# Patient Record
Sex: FEMALE | Race: WHITE | NOT HISPANIC OR LATINO | Employment: FULL TIME | URBAN - METROPOLITAN AREA
[De-identification: names, ages, dates, MRNs, and addresses within clinical notes are randomized per-mention and may not be internally consistent; named-entity substitution may affect disease eponyms.]

---

## 2017-06-29 RX ORDER — LOTEPREDNOL ETABONATE 5 MG/ML
1 SUSPENSION/ DROPS OPHTHALMIC 2 TIMES DAILY
Qty: 5 ML | Refills: 3 | Status: SHIPPED | OUTPATIENT
Start: 2017-06-29 | End: 2018-02-21 | Stop reason: SDUPTHER

## 2017-08-23 ENCOUNTER — OFFICE VISIT (OUTPATIENT)
Dept: OPHTHALMOLOGY | Facility: CLINIC | Age: 47
End: 2017-08-23
Payer: COMMERCIAL

## 2017-08-23 DIAGNOSIS — M35.01 KERATITIS SICCA, BILATERAL: Primary | ICD-10-CM

## 2017-08-23 DIAGNOSIS — Z46.0 ENCOUNTER FOR FITTING OR ADJUSTMENT OF SPECTACLES OR CONTACT LENSES: ICD-10-CM

## 2017-08-23 DIAGNOSIS — H52.4 BILATERAL PRESBYOPIA: ICD-10-CM

## 2017-08-23 DIAGNOSIS — H52.03 HYPEROPIA, BILATERAL: ICD-10-CM

## 2017-08-23 PROCEDURE — 92015 DETERMINE REFRACTIVE STATE: CPT | Mod: S$GLB,,, | Performed by: OPTOMETRIST

## 2017-08-23 PROCEDURE — 99999 PR PBB SHADOW E&M-EST. PATIENT-LVL I: CPT | Mod: PBBFAC,,, | Performed by: OPTOMETRIST

## 2017-08-23 PROCEDURE — 68761 CLOSE TEAR DUCT OPENING: CPT | Mod: E4,S$GLB,, | Performed by: OPTOMETRIST

## 2017-08-23 PROCEDURE — 92310 CONTACT LENS FITTING OU: CPT | Mod: ,,, | Performed by: OPTOMETRIST

## 2017-08-23 PROCEDURE — 92012 INTRM OPH EXAM EST PATIENT: CPT | Mod: 25,S$GLB,, | Performed by: OPTOMETRIST

## 2017-08-23 NOTE — PROGRESS NOTES
HPI     Blurred vision left eye today may be due to the weather. Last eye exam   08/08/2016. No dilation today. Update glasses and contact lenses RX.   Discuss fee to update contact lenses.    Last edited by Cherry Hampton on 8/23/2017 11:10 AM. (History)            Assessment /Plan     For exam results, see Encounter Report.    Keratitis sicca, bilateral    Hyperopia, bilateral    Bilateral presbyopia    Encounter for fitting or adjustment of spectacles or contact lenses      Severe dry eye, taking Restasis, Artificial tears, Omega 3    Inserted bilateral punctal plugs, left lower lid puncta and right lower lid puncta.  Oasis Form Fit Hydrogel Canicular Plugs  Ref 6303 Lot [id number] 2018-07  Pt tolerated well.    Discussed CL charges.  Dispense Final Rx for glasses  No changes CL Rx  RTC 1 year

## 2017-09-21 DIAGNOSIS — H10.13 ALLERGIC CONJUNCTIVITIS, BILATERAL: ICD-10-CM

## 2017-09-21 RX ORDER — OLOPATADINE HYDROCHLORIDE 2 MG/ML
1 SOLUTION/ DROPS OPHTHALMIC DAILY PRN
Qty: 1 BOTTLE | Refills: 12 | Status: SHIPPED | OUTPATIENT
Start: 2017-09-21 | End: 2018-10-04

## 2017-09-29 ENCOUNTER — TELEPHONE (OUTPATIENT)
Dept: OPHTHALMOLOGY | Facility: CLINIC | Age: 47
End: 2017-09-29

## 2017-09-29 NOTE — TELEPHONE ENCOUNTER
----- Message from Joana Boswell sent at 9/29/2017  9:54 AM CDT -----  Contact: pt  She's calling to speak to a nurse, would not give reason, please advise [phone] (home)

## 2017-10-13 RX ORDER — CYCLOSPORINE 0.5 MG/ML
1 EMULSION OPHTHALMIC 2 TIMES DAILY
Qty: 60 EACH | Refills: 12 | Status: SHIPPED | OUTPATIENT
Start: 2017-10-13 | End: 2018-11-14 | Stop reason: SDUPTHER

## 2017-10-13 RX ORDER — CYCLOSPORINE 0.5 MG/ML
EMULSION OPHTHALMIC
Qty: 60 VIAL | Refills: 8 | Status: SHIPPED | OUTPATIENT
Start: 2017-10-13 | End: 2018-10-12 | Stop reason: SDUPTHER

## 2018-02-16 ENCOUNTER — TELEPHONE (OUTPATIENT)
Dept: OPHTHALMOLOGY | Facility: CLINIC | Age: 48
End: 2018-02-16

## 2018-02-16 NOTE — TELEPHONE ENCOUNTER
----- Message from Lianne Mayer sent at 2/16/2018 11:07 AM CST -----  Contact: PT   PT states she needs a trail set of contacts a little stronger than she has currently called in.  [phone] (home)

## 2018-02-16 NOTE — TELEPHONE ENCOUNTER
----- Message from Lianne Mayer sent at 2/16/2018 11:07 AM CST -----  Contact: PT   PT states she needs a trail set of contacts a little stronger than she has currently called in.  [phone] (home)

## 2018-02-16 NOTE — TELEPHONE ENCOUNTER
----- Message from Edyta Pack sent at 2/16/2018  3:01 PM CST -----  Contact: pt  She's calling in regards to appt pt stated the RX can be called in for the trial pair contacts instead of her making an appt [phone] ([place])

## 2018-02-21 RX ORDER — LOTEPREDNOL ETABONATE 5 MG/ML
1 SUSPENSION/ DROPS OPHTHALMIC 2 TIMES DAILY
Qty: 5 ML | Refills: 3 | Status: SHIPPED | OUTPATIENT
Start: 2018-02-21 | End: 2018-10-04

## 2018-10-04 ENCOUNTER — OFFICE VISIT (OUTPATIENT)
Dept: OPHTHALMOLOGY | Facility: CLINIC | Age: 48
End: 2018-10-04
Payer: COMMERCIAL

## 2018-10-04 DIAGNOSIS — H52.03 HYPEROPIA, BILATERAL: ICD-10-CM

## 2018-10-04 DIAGNOSIS — H52.4 BILATERAL PRESBYOPIA: ICD-10-CM

## 2018-10-04 DIAGNOSIS — Z46.0 ENCOUNTER FOR FITTING OR ADJUSTMENT OF SPECTACLES OR CONTACT LENSES: ICD-10-CM

## 2018-10-04 DIAGNOSIS — M35.01 KERATITIS SICCA, BILATERAL: Primary | ICD-10-CM

## 2018-10-04 PROCEDURE — 92012 INTRM OPH EXAM EST PATIENT: CPT | Mod: S$GLB,,, | Performed by: OPTOMETRIST

## 2018-10-04 PROCEDURE — 92015 DETERMINE REFRACTIVE STATE: CPT | Mod: S$GLB,,, | Performed by: OPTOMETRIST

## 2018-10-04 PROCEDURE — 99999 PR PBB SHADOW E&M-EST. PATIENT-LVL II: CPT | Mod: PBBFAC,,, | Performed by: OPTOMETRIST

## 2018-10-04 PROCEDURE — 92310 CONTACT LENS FITTING OU: CPT | Mod: ,,, | Performed by: OPTOMETRIST

## 2018-10-04 NOTE — PROGRESS NOTES
HPI     Patient returns for her yearly exam and updated contact lenses , patient   c/o blurred vision at distance when driving and looking down the hallway   patient is a teacher and wears readers over contacts, patient c/o dry eyes   and uses Restasis bid ou   wants to try something new.        PCP: Dr. Espinoza    Referred by Mita Delaney  H/O SCL WEAR 2014 APPROX.  Punctal Plugs ? Permanent 1 year ago  Gel Plugs (CPG 6/17/14)      Restasis OU BID    Last edited by Cassandra Loo COT on 10/4/2018  1:55 PM. (History)        ROS     Positive for: HENT    Last edited by Greyson Fonseca, OD on 10/4/2018  2:29 PM. (History)        Assessment /Plan     For exam results, see Encounter Report.    Keratitis sicca, bilateral    Hyperopia, bilateral    Bilateral presbyopia    Encounter for fitting or adjustment of spectacles or contact lenses      Continue Restasis bid OU    Will try monovision OS near with contacts, likes AV Aurea, more comfort    Discussed CL charges.  Dispense Final Rx for glasses  Note changes CL Rx  RTC 1 year  Discussed above and answered questions.

## 2018-10-08 ENCOUNTER — TELEPHONE (OUTPATIENT)
Dept: OPHTHALMOLOGY | Facility: CLINIC | Age: 48
End: 2018-10-08

## 2018-10-08 NOTE — TELEPHONE ENCOUNTER
----- Message from Derik Jay MA sent at 10/8/2018  4:34 PM CDT -----  Contact: pt   Lina Govea,  Did you check this Rx?      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

## 2018-10-08 NOTE — TELEPHONE ENCOUNTER
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

## 2018-10-08 NOTE — TELEPHONE ENCOUNTER
Call patient to let her know that Dr. Fonseca would like her to come in to try contact in the office so we can get correct power of contact lenses.

## 2018-10-12 ENCOUNTER — OFFICE VISIT (OUTPATIENT)
Dept: OPHTHALMOLOGY | Facility: CLINIC | Age: 48
End: 2018-10-12
Payer: COMMERCIAL

## 2018-10-12 DIAGNOSIS — Z46.0 ENCOUNTER FOR FITTING OR ADJUSTMENT OF SPECTACLES OR CONTACT LENSES: Primary | ICD-10-CM

## 2018-10-12 PROCEDURE — 99499 UNLISTED E&M SERVICE: CPT | Mod: S$GLB,,, | Performed by: OPTOMETRIST

## 2018-10-12 NOTE — PROGRESS NOTES
HPI     The patient states the contact trial she was given did not work for her.   The patient states the fit was fine but her eyes were very blurry at all   distances and she couldn't wear them. The patient states she would just   like a tad bit stronger prescription than what she had in her old   contacts.  Referred by Mita Delaney  H/O SCL WEAR 2014 APPROX.  Punctal Plugs ? Permanent 1 year ago  Gel Plugs (CPG 6/17/14)  Restasis OU BID    Last edited by Greyson Fonseca, OD on 10/12/2018  1:29 PM. (History)            Assessment /Plan     For exam results, see Encounter Report.    Encounter for fitting or adjustment of spectacles or contact lenses      Improved Va with increased CL    Dispense as needed.  RTC prn

## 2018-11-14 RX ORDER — CYCLOSPORINE 0.5 MG/ML
EMULSION OPHTHALMIC
Qty: 60 VIAL | Refills: 12 | Status: SHIPPED | OUTPATIENT
Start: 2018-11-14 | End: 2019-08-29

## 2018-11-15 NOTE — TELEPHONE ENCOUNTER
Call patient to verify home address. Inform patient that I was mailing glasses prescription on today at 1:40

## 2018-11-15 NOTE — TELEPHONE ENCOUNTER
----- Message from Gina Hampton sent at 11/15/2018 12:53 PM CST -----  Contact: pt  The pt states she lost her eye glass prescription, the pt ask that the prescription is mailed to her home, can be reached at [phone]///thxMW

## 2019-01-23 PROBLEM — H92.03 OTALGIA OF BOTH EARS: Status: ACTIVE | Noted: 2019-01-23

## 2019-01-23 PROBLEM — I10 HYPERTENSION: Status: ACTIVE | Noted: 2019-01-23

## 2019-01-23 PROBLEM — J01.11 ACUTE RECURRENT FRONTAL SINUSITIS: Status: ACTIVE | Noted: 2019-01-23

## 2019-04-29 PROBLEM — J01.11 ACUTE RECURRENT FRONTAL SINUSITIS: Status: RESOLVED | Noted: 2019-01-23 | Resolved: 2019-04-29

## 2019-08-29 PROBLEM — G43.711 INTRACTABLE CHRONIC MIGRAINE WITHOUT AURA AND WITH STATUS MIGRAINOSUS: Status: ACTIVE | Noted: 2019-08-29

## 2019-09-24 PROBLEM — Z00.00 ANNUAL PHYSICAL EXAM: Status: ACTIVE | Noted: 2019-09-24

## 2019-09-24 PROBLEM — R53.82 CHRONIC FATIGUE: Status: ACTIVE | Noted: 2019-09-24

## 2019-09-24 PROBLEM — G43.719 INTRACTABLE CHRONIC MIGRAINE WITHOUT AURA AND WITHOUT STATUS MIGRAINOSUS: Status: ACTIVE | Noted: 2019-08-29

## 2019-09-24 PROBLEM — J30.1 SEASONAL ALLERGIC RHINITIS DUE TO POLLEN: Status: ACTIVE | Noted: 2019-09-24

## 2019-10-30 PROBLEM — D72.819 LEUCOPENIA: Status: ACTIVE | Noted: 2019-10-30

## 2019-10-30 PROBLEM — R73.9 HYPERGLYCEMIA: Status: ACTIVE | Noted: 2019-10-30

## 2019-10-30 PROBLEM — R74.8 INCREASED LIVER ENZYMES: Status: ACTIVE | Noted: 2019-10-30

## 2019-10-30 PROBLEM — E83.52 HYPERCALCEMIA: Status: ACTIVE | Noted: 2019-10-30

## 2019-10-30 PROBLEM — E55.9 VITAMIN D DEFICIENCY: Status: ACTIVE | Noted: 2019-10-30

## 2019-11-04 PROBLEM — R74.8 INCREASED LIVER ENZYMES: Status: RESOLVED | Noted: 2019-10-30 | Resolved: 2019-11-04

## 2019-11-04 PROBLEM — H92.03 OTALGIA OF BOTH EARS: Status: RESOLVED | Noted: 2019-01-23 | Resolved: 2019-11-04

## 2019-11-04 PROBLEM — E83.52 HYPERCALCEMIA: Status: RESOLVED | Noted: 2019-10-30 | Resolved: 2019-11-04

## 2019-11-04 PROBLEM — Z00.00 ANNUAL PHYSICAL EXAM: Status: RESOLVED | Noted: 2019-09-24 | Resolved: 2019-11-04

## 2019-11-04 PROBLEM — R73.9 HYPERGLYCEMIA: Status: RESOLVED | Noted: 2019-10-30 | Resolved: 2019-11-04

## 2020-04-23 PROBLEM — G43.809 VESTIBULAR MIGRAINE: Status: ACTIVE | Noted: 2020-04-23

## 2020-08-02 PROBLEM — D72.819 LEUCOPENIA: Status: RESOLVED | Noted: 2019-10-30 | Resolved: 2020-08-02

## 2023-11-17 NOTE — TELEPHONE ENCOUNTER
----- Message from Koko Hampton sent at 10/13/2017 11:08 AM CDT -----  Contact: Pt   Pt states she needs a refill on her Restasis.Pt is requesting # the new version# of Restasis# /Pt can be reached at [phone] (home)     [id number] [address]  [address] [address]  Phone: [phone] Fax: [phone]    
patient

## 2025-01-13 ENCOUNTER — HOSPITAL ENCOUNTER (EMERGENCY)
Facility: HOSPITAL | Age: 55
Discharge: HOME | End: 2025-01-13
Attending: EMERGENCY MEDICINE
Payer: COMMERCIAL

## 2025-01-13 VITALS
OXYGEN SATURATION: 100 % | HEART RATE: 85 BPM | RESPIRATION RATE: 20 BRPM | SYSTOLIC BLOOD PRESSURE: 134 MMHG | TEMPERATURE: 98 F | HEIGHT: 60 IN | WEIGHT: 115.31 LBS | DIASTOLIC BLOOD PRESSURE: 81 MMHG | BODY MASS INDEX: 22.64 KG/M2

## 2025-01-13 DIAGNOSIS — S01.112A LACERATION OF LEFT EYEBROW, INITIAL ENCOUNTER: Primary | ICD-10-CM

## 2025-01-13 PROCEDURE — 25500020 PHARM REV CODE 255

## 2025-01-13 PROCEDURE — 25000003 PHARM REV CODE 250

## 2025-01-13 PROCEDURE — 12015 RPR F/E/E/N/L/M 7.6-12.5 CM: CPT

## 2025-01-13 PROCEDURE — 90471 IMMUNIZATION ADMIN: CPT

## 2025-01-13 PROCEDURE — 90715 TDAP VACCINE 7 YRS/> IM: CPT

## 2025-01-13 PROCEDURE — 63600175 PHARM REV CODE 636 W HCPCS

## 2025-01-13 PROCEDURE — A9585 GADOBUTROL INJECTION: HCPCS

## 2025-01-13 PROCEDURE — 99285 EMERGENCY DEPT VISIT HI MDM: CPT | Mod: 25

## 2025-01-13 RX ORDER — IBUPROFEN 800 MG/1
800 TABLET ORAL EVERY 6 HOURS PRN
Qty: 20 TABLET | Refills: 0 | Status: SHIPPED | OUTPATIENT
Start: 2025-01-13

## 2025-01-13 RX ORDER — LIDOCAINE HYDROCHLORIDE 10 MG/ML
10 INJECTION, SOLUTION EPIDURAL; INFILTRATION; INTRACAUDAL; PERINEURAL
Status: COMPLETED | OUTPATIENT
Start: 2025-01-13 | End: 2025-01-13

## 2025-01-13 RX ORDER — MUPIROCIN 20 MG/G
OINTMENT TOPICAL 3 TIMES DAILY
Qty: 22 G | Refills: 1 | Status: SHIPPED | OUTPATIENT
Start: 2025-01-13

## 2025-01-13 RX ORDER — GADOBUTROL 604.72 MG/ML
10 INJECTION INTRAVENOUS
Status: COMPLETED | OUTPATIENT
Start: 2025-01-13 | End: 2025-01-13

## 2025-01-13 RX ADMIN — GADOBUTROL 5 ML: 604.72 INJECTION INTRAVENOUS at 09:01

## 2025-01-13 RX ADMIN — BACITRACIN ZINC, NEOMYCIN, POLYMYXIN B 1 EACH: 400; 3.5; 5 OINTMENT TOPICAL at 10:01

## 2025-01-13 RX ADMIN — LIDOCAINE HYDROCHLORIDE 100 MG: 10 INJECTION, SOLUTION EPIDURAL; INFILTRATION; INTRACAUDAL at 10:01

## 2025-01-13 RX ADMIN — CLOSTRIDIUM TETANI TOXOID ANTIGEN (FORMALDEHYDE INACTIVATED), CORYNEBACTERIUM DIPHTHERIAE TOXOID ANTIGEN (FORMALDEHYDE INACTIVATED), BORDETELLA PERTUSSIS TOXOID ANTIGEN (GLUTARALDEHYDE INACTIVATED), BORDETELLA PERTUSSIS FILAMENTOUS HEMAGGLUTININ ANTIGEN (FORMALDEHYDE INACTIVATED), BORDETELLA PERTUSSIS PERTACTIN ANTIGEN, AND BORDETELLA PERTUSSIS FIMBRIAE 2/3 ANTIGEN 0.5 ML: 5; 2; 2.5; 5; 3; 5 INJECTION, SUSPENSION INTRAMUSCULAR at 09:01

## 2025-01-13 NOTE — Clinical Note
"Joselyn "Joselynsilvia Hernandez was seen and treated in our emergency department on 1/13/2025.  She may return to work on 01/15/2025.       If you have any questions or concerns, please don't hesitate to call.      Yuliet Webb PA-C"

## 2025-01-14 NOTE — ED NOTES
Bed: TL 01  Expected date:   Expected time:   Means of arrival:   Comments:  Joselyn Hernandez, Yuliet RAZA, FEDE  01/13/25 1618

## 2025-01-14 NOTE — ED PROVIDER NOTES
Encounter Date: 1/13/2025       History     Chief Complaint   Patient presents with    Facial Laceration     Pt c/o lac x2 above L eye after trip/fall on dog. Denies LOC/bldthnrs. Bleeding controlled at this time. Unk last tetanus     54-year-old female with past medical history of hypertension and vertigo presenting to the emergency room with complaints of 2 lacerations to the left eyebrow that she sustained just prior to arrival after tripping over her dog.  Patient reports that she was putting away [place] decorations when her dog ran under her feet and knocked her down; patient has struck her face against the tile floor.  Patient does not use blood thinners.  Patient denies loss of consciousness, neck pain, changes in vision, altered mental status, confusion, increased somnolence, nausea, vomiting, seizure-like activity, and all other symptoms at this time.    The history is provided by the patient.     Review of patient's allergies indicates:   Allergen Reactions    Mold      Migraines headaches itching hives    Sulfa (sulfonamide antibiotics)     Topiramate      hives    Codeine Nausea Only     Past Medical History:   Diagnosis Date    Dry eyes     Hypertension      Past Surgical History:   Procedure Laterality Date    breast augmentation       Family History   Problem Relation Name Age of Onset    Cataracts Paternal Aunt      Hypertension Maternal Aunt      Hypertension Maternal Uncle       Social History     Tobacco Use    Smoking status: Some Days     Types: Cigarettes    Smokeless tobacco: Never   Substance Use Topics    Alcohol use: Yes    Drug use: No     Review of Systems   Constitutional:  Negative for activity change and fever.   HENT:  Negative for sore throat.    Respiratory:  Negative for shortness of breath.    Cardiovascular:  Negative for chest pain.   Gastrointestinal:  Negative for nausea.   Genitourinary:  Negative for dysuria.   Musculoskeletal:  Negative for back pain and neck pain.    Skin:  Positive for wound. Negative for rash.   Neurological:  Negative for dizziness, syncope, weakness, light-headedness and headaches.   Hematological:  Does not bruise/bleed easily.   All other systems reviewed and are negative.      Physical Exam     Initial Vitals [01/13/25 1924]   BP Pulse Resp Temp SpO2   137/82 87 18 98.3 °F (36.8 °C) 97 %      MAP       --         Physical Exam    Nursing note reviewed.  Constitutional: She appears well-developed and well-nourished.  Non-toxic appearance. She does not have a sickly appearance. She does not appear ill. No distress.   HENT:   Head: Normocephalic. Head is with laceration.       Right Ear: Hearing normal.   Left Ear: Hearing normal.   Nose: Nose normal. Mouth/Throat: Uvula is midline and oropharynx is clear and moist.   Eyes: Conjunctivae, EOM and lids are normal. Pupils are equal, round, and reactive to light.   Neck: Trachea normal. Neck supple.   Normal range of motion.   Full passive range of motion without pain.     Cardiovascular:  Normal rate, regular rhythm, normal heart sounds, intact distal pulses and normal pulses.           Pulmonary/Chest: Effort normal and breath sounds normal.   Abdominal: Abdomen is soft. Bowel sounds are normal. There is no abdominal tenderness. There is no rebound and no guarding.   Musculoskeletal:         General: Normal range of motion.      Cervical back: Normal, full passive range of motion without pain, normal range of motion and neck supple.     Neurological: She is alert and oriented to person, place, and time. She has normal strength and normal reflexes. No cranial nerve deficit or sensory deficit. GCS eye subscore is 4. GCS verbal subscore is 5. GCS motor subscore is 6.   Skin: Skin is warm and dry.   Psychiatric: She has a normal mood and affect. Her behavior is normal. Thought content normal.         ED Course   Lac Repair    Date/Time: 1/14/2025 3:53 AM    Performed by: Yuliet Webb PA-C  Authorized by:  Yusef Solorio MD    Consent:     Consent obtained:  Verbal    Consent given by:  Patient    Risks, benefits, and alternatives were discussed: yes      Risks discussed:  Infection, pain and poor cosmetic result  Universal protocol:     Procedure explained and questions answered to patient or proxy's satisfaction: yes      Patient identity confirmed:  Verbally with patient  Anesthesia:     Anesthesia method:  Local infiltration    Local anesthetic:  Lidocaine 1% w/o epi  Laceration details:     Location:  Face    Face location:  L eyebrow    Length (cm):  11 (Two lacerations to left eyebrow:  Upper laceration approximately 4 cm; lower laceration approximately 7 cm)  Pre-procedure details:     Preparation:  Patient was prepped and draped in usual sterile fashion  Exploration:     Wound exploration: wound explored through full range of motion      Contaminated: no    Treatment:     Area cleansed with:  Povidone-iodine    Amount of cleaning:  Standard    Irrigation solution:  Sterile saline    Irrigation volume:  250    Irrigation method:  Syringe  Skin repair:     Repair method:  Sutures    Suture size:  6-0    Suture material:  Prolene    Suture technique:  Simple interrupted    Number of sutures:  14  Approximation:     Approximation:  Close  Repair type:     Repair type:  Simple  Post-procedure details:     Dressing:  Antibiotic ointment and non-adherent dressing    Procedure completion:  Tolerated well, no immediate complications    Labs Reviewed - No data to display       Imaging Results              MRI Brain W WO Contrast (Final result)  Result time 01/13/25 22:05:28   Procedure changed from MRI Brain With Contrast     Final result by Regina Raymundo MD (01/13/25 22:05:28)                   Impression:      No acute intracranial finding      Electronically signed by: Regina Raymundo  Date:    01/13/2025  Time:    22:05               Narrative:    EXAMINATION:  MRI BRAIN W WO CONTRAST    CLINICAL  HISTORY:  Head trauma, moderate-severe;Abnormal CT; recommended with f/u MRI with contrast;    TECHNIQUE:  Multiplanar multisequence MR imaging of the brain was performed before and after the administration of 5 mL Gadavist intravenous contrast.    COMPARISON:  CT comparison    FINDINGS:  Left thalamic venous angioma or cavernous malformation likely correlating with CT findings.    No acute ischemia.    No mass effect or midline shift    No hydrocephalus    Major vascular flow voids present                                        CT Head Without Contrast (Final result)  Result time 01/13/25 20:12:39      Final result by Sacha Rose MD (01/13/25 20:12:39)                   Impression:      Atypical area in the left thalamus favored to be a cavernous malformation, but with hyperacute hemorrhage difficult to exclude, although this is an atypical appearance and location for traumatic hemorrhage.    Details as above.    This report was flagged in Epic as abnormal.    All CT scans at this facility are performed  using dose modulation techniques as appropriate to performed exam including the following:  automated exposure control; adjustment of mA and/or kV according to the patients size (this includes techniques or standardized protocols for targeted exams where dose is matched to indication/reason for exam: i.e. extremities or head);  iterative reconstruction technique.      Electronically signed by: Sacha Rose  Date:    01/13/2025  Time:    20:12               Narrative:    EXAMINATION:  CT HEAD WITHOUT CONTRAST    CLINICAL HISTORY:  Facial trauma, blunt;    TECHNIQUE:  Low dose axial CT images obtained throughout the head without intravenous contrast. Sagittal and coronal reconstructions were performed.    COMPARISON:  None.    FINDINGS:  Intracranial compartment:    Ventricles and sulci are normal in size for age without evidence of hydrocephalus. No extra-axial blood or fluid collections.    High density in  the left thalamus without surrounding edema, an atypical location for traumatic hemorrhage.  This could relate to cavernous malformation.  Correlation to prior exams would be recommended to establish stability.  If stability cannot be established contrast enhanced MRI would be recommended.  Otherwise no area of concern for parenchymal mass, hemorrhage, edema or major vascular distribution infarct.    Skull/extracranial contents (limited evaluation): No fracture. Mastoid air cells and paranasal sinuses are essentially clear.                                       CT Maxillofacial Without Contrast (Final result)  Result time 01/13/25 20:16:35      Final result by Sacha Rose MD (01/13/25 20:16:35)                   Impression:      No evidence of an acute displaced fracture.    Soft tissue swelling.    All CT scans at this facility are performed  using dose modulation techniques as appropriate to performed exam including the following:  automated exposure control; adjustment of mA and/or kV according to the patients size (this includes techniques or standardized protocols for targeted exams where dose is matched to indication/reason for exam: i.e. extremities or head);  iterative reconstruction technique.      Electronically signed by: Sacha Rose  Date:    01/13/2025  Time:    20:16               Narrative:    EXAMINATION:  CT MAXILLOFACIAL WITHOUT CONTRAST    CLINICAL HISTORY:  Facial trauma, blunt;    TECHNIQUE:  Low dose CT images throughout the region of the facial bones.  Axial, sagittal and coronal reformations were obtained.  Contrast was not administered.    COMPARISON:  None    FINDINGS:  Soft tissue swelling.  The globes and intraorbital contents are within normal limits.  No orbital fracture.    The remainder of the facial bones appear intact without evidence of an acute displaced fracture.  No osseous destructive lesions.    Temporomandibular joints appropriately position without evidence of  dislocation.    Paranasal sinuses essentially clear.  Mastoids are clear.    Cervical spine degenerative changes.    Limited intracranial evaluation is unremarkable.                                       Medications   LIDOcaine (PF) 10 mg/ml (1%) injection 100 mg (100 mg Infiltration Given 1/13/25 2244)   neomycin-bacitracnZn-polymyxnB packet (1 each Topical (Top) Given 1/13/25 2225)   Tdap vaccine injection 0.5 mL (0.5 mLs Intramuscular Given 1/13/25 2156)   gadobutroL (GADAVIST) injection 10 mL (5 mLs Intravenous Given 1/13/25 2133)     Medical Decision Making  Amount and/or Complexity of Data Reviewed  Radiology: ordered.     Details: CT head without contrast:Atypical area in the left thalamus favored to be a cavernous malformation, but with hyperacute hemorrhage difficult to exclude, although this is an atypical appearance and location for traumatic hemorrhage.  Correlation of prior exams would be recommended to establish stability.  If stability can not be established, contrast-enhanced MRI would be recommended.    MRI brain with contrast:  No acute intracranial finding.      CT max face without contrast reveals no evidence of an acute displaced fracture.  Soft tissue swelling noted.    Risk  OTC drugs.  Prescription drug management.  Risk Details: Regarding LACERATION CARE, patient was instructed to wash hands with soap and warm water before and after caring for wound; keep the wound dry for the first 24 to 48 hours and then gently clean the wound once or twice a day with cool water using soap to clean around the wound; avoid using alcohol or hydrogen peroxide to clean wound unless directed to; and use bandages to keep wound clean and protected and to prevent swelling.  Advised patient to contact primary healthcare provider if wound splits open; becomes extremely painful; appears to not be healing; has a foreign object present; develop a purulent discharge; or note the skin around the wound becoming numb,  edematous, or erythematous.  Patient instructed to follow up with primary care provider for wound re-check or closure removal in 8-10 days.                                       Clinical Impression:  Final diagnoses:  [S01.112A] Laceration of left eyebrow, initial encounter (Primary)          ED Disposition Condition    Discharge Stable          ED Prescriptions       Medication Sig Dispense Start Date End Date Auth. Provider    ibuprofen (ADVIL,MOTRIN) 800 MG tablet Take 1 tablet (800 mg total) by mouth every 6 (six) hours as needed for Pain. 20 tablet 1/13/2025 -- Yuliet Webb PA-C    mupirocin (BACTROBAN) 2 % ointment Apply topically 3 (three) times daily. 22 g 1/13/2025 -- Yuliet Webb PA-C          Follow-up Information       Follow up With Specialties Details Why Contact Info    [facility] - Emergency Dept. Emergency Medicine  If symptoms worsen [id number] [facility] [id number] [phone]             Yuliet Webb PA-C  01/14/25 9008